# Patient Record
Sex: FEMALE | Race: WHITE | Employment: PART TIME | URBAN - METROPOLITAN AREA
[De-identification: names, ages, dates, MRNs, and addresses within clinical notes are randomized per-mention and may not be internally consistent; named-entity substitution may affect disease eponyms.]

---

## 2017-01-18 ENCOUNTER — TELEPHONE (OUTPATIENT)
Dept: FAMILY MEDICINE | Facility: CLINIC | Age: 42
End: 2017-01-18

## 2017-04-05 ENCOUNTER — TELEPHONE (OUTPATIENT)
Dept: FAMILY MEDICINE | Facility: CLINIC | Age: 42
End: 2017-04-05

## 2017-04-05 NOTE — TELEPHONE ENCOUNTER
Left message for patient to return call.  If calls needs physical and pap then to discuss having a mammogram. martinez

## 2017-04-27 ENCOUNTER — OFFICE VISIT (OUTPATIENT)
Dept: FAMILY MEDICINE | Facility: CLINIC | Age: 42
End: 2017-04-27
Payer: COMMERCIAL

## 2017-04-27 VITALS
TEMPERATURE: 98.8 F | DIASTOLIC BLOOD PRESSURE: 80 MMHG | BODY MASS INDEX: 26.6 KG/M2 | WEIGHT: 140.8 LBS | SYSTOLIC BLOOD PRESSURE: 114 MMHG | HEART RATE: 76 BPM

## 2017-04-27 DIAGNOSIS — M26.609 TMJ (TEMPOROMANDIBULAR JOINT SYNDROME): Primary | ICD-10-CM

## 2017-04-27 PROCEDURE — 99213 OFFICE O/P EST LOW 20 MIN: CPT | Performed by: PHYSICIAN ASSISTANT

## 2017-04-27 RX ORDER — PREDNISONE 20 MG/1
60 TABLET ORAL DAILY
Qty: 21 TABLET | Refills: 0 | Status: SHIPPED | OUTPATIENT
Start: 2017-04-27 | End: 2017-09-05

## 2017-04-27 RX ORDER — LORATADINE 10 MG/1
10 TABLET ORAL DAILY
COMMUNITY

## 2017-04-27 ASSESSMENT — ENCOUNTER SYMPTOMS
EYE REDNESS: 0
SHORTNESS OF BREATH: 0
DOUBLE VISION: 0
PHOTOPHOBIA: 0
HEARTBURN: 0
DEPRESSION: 0
CONSTIPATION: 0
FOCAL WEAKNESS: 0
BACK PAIN: 0
HALLUCINATIONS: 0
INSOMNIA: 0
DIZZINESS: 0
ORTHOPNEA: 0
FEVER: 0
DYSURIA: 0
LOSS OF CONSCIOUSNESS: 0
WEIGHT LOSS: 0
PALPITATIONS: 0
DIAPHORESIS: 0
MYALGIAS: 0
ABDOMINAL PAIN: 0
EYE PAIN: 0
COUGH: 0
WEAKNESS: 0
SEIZURES: 0
HEADACHES: 0
SORE THROAT: 0
HEMOPTYSIS: 0
FREQUENCY: 0
DIARRHEA: 0
SPUTUM PRODUCTION: 0
BLOOD IN STOOL: 0
NECK PAIN: 0
NAUSEA: 0
NERVOUS/ANXIOUS: 0
WHEEZING: 0
BLURRED VISION: 0
EYE DISCHARGE: 0
NEUROLOGICAL NEGATIVE: 1
VOMITING: 0
TINGLING: 0
SENSORY CHANGE: 0

## 2017-04-27 ASSESSMENT — LIFESTYLE VARIABLES: SUBSTANCE_ABUSE: 0

## 2017-04-27 ASSESSMENT — PAIN SCALES - GENERAL: PAINLEVEL: MODERATE PAIN (5)

## 2017-04-27 NOTE — NURSING NOTE
"Chief Complaint   Patient presents with     Jaw Pain       Initial /80 (BP Location: Right arm, Patient Position: Chair, Cuff Size: Adult Regular)  Pulse 76  Temp 98.8  F (37.1  C) (Tympanic)  Wt 140 lb 12.8 oz (63.9 kg)  BMI 26.6 kg/m2 Estimated body mass index is 26.6 kg/(m^2) as calculated from the following:    Height as of 9/2/16: 5' 1\" (1.549 m).    Weight as of this encounter: 140 lb 12.8 oz (63.9 kg).  Medication Reconciliation: complete    Health Maintenance that is potentially due pending provider review:  Pap Smear    Pt will schedule physical appt.    Mercedes ACHARYA CMA    "

## 2017-04-27 NOTE — MR AVS SNAPSHOT
"              After Visit Summary   2017    Kamille Montiel    MRN: [id number]           Patient Information     Date Of Birth          1975        Visit Information        Provider Department      2017 11:20 AM Tito Hair PA-C [facility]        Today's Diagnoses     TMJ (temporomandibular joint syndrome)    -  1       Follow-ups after your visit        Follow-up notes from your care team     Return if symptoms worsen or fail to improve.      Who to contact     If you have questions or need follow up information about today's clinic visit or your schedule please contact [facility] directly at [phone].  Normal or non-critical lab and imaging results will be communicated to you by MyChart, letter or phone within 4 business days after the clinic has received the results. If you do not hear from us within 7 days, please contact the clinic through MyChart or phone. If you have a critical or abnormal lab result, we will notify you by phone as soon as possible.  Submit refill requests through [employer] or call your pharmacy and they will forward the refill request to us. Please allow 3 business days for your refill to be completed.          Additional Information About Your Visit        MyChart Information     [employer] lets you send messages to your doctor, view your test results, renew your prescriptions, schedule appointments and more. To sign up, go to [email or web address] . Click on \"Log in\" on the left side of the screen, which will take you to the Welcome page. Then click on \"Sign up Now\" on the right side of the page.     You will be asked to enter the access code listed below, as well as some personal information. Please follow the directions to create your username and password.     Your access code is: [email or web address]  Expires: 2017 11:50 AM     Your access code will  in 90 days. If you need help or a new code, please call your [facility] or " [phone].        Care EveryWhere ID     This is your Care EveryWhere ID. This could be used by other organizations to access your [place] medical records  [id number]        Your Vitals Were     Pulse Temperature BMI (Body Mass Index)             76 98.8  F (37.1  C) (Tympanic) 26.6 kg/m2          Blood Pressure from Last 3 Encounters:   04/27/17 114/80   09/02/16 133/87   09/02/16 171/84    Weight from Last 3 Encounters:   04/27/17 140 lb 12.8 oz (63.9 kg)   09/02/16 144 lb 9.6 oz (65.6 kg)   04/12/16 134 lb (60.8 kg)              Today, you had the following     No orders found for display         Today's Medication Changes          These changes are accurate as of: 4/27/17 11:50 AM.  If you have any questions, ask your nurse or doctor.               Start taking these medicines.        Dose/Directions    predniSONE 20 MG tablet   Commonly known as:  DELTASONE   Used for:  TMJ (temporomandibular joint syndrome)   Started by:  Tito Hair PA-C        Dose:  60 mg   Take 3 tablets (60 mg) by mouth daily   Quantity:  21 tablet   Refills:  0            Where to get your medicines      These medications were sent to [facility] [address] [address]     Phone:  [phone]     predniSONE 20 MG tablet                Primary Care Provider Office Phone # Fax #    Marielos Torres [phone]       [address] [address]        Thank you!     Thank you for choosing [facility]  for your care. Our goal is always to provide you with excellent care. Hearing back from our patients is one way we can continue to improve our services. Please take a few minutes to complete the written survey that you may receive in the mail after your visit with us. Thank you!             Your Updated Medication List - Protect others around you: Learn how to safely use,  store and throw away your medicines at www.disposemymeds.org.          This list is accurate as of: 4/27/17 11:50 AM.  Always use your most recent med list.                   Brand Name Dispense Instructions for use    CLARITIN 10 MG tablet   Generic drug:  loratadine      Take 10 mg by mouth daily       CYCLOBENZAPRINE HCL PO      Take 2.5 mg by mouth       predniSONE 20 MG tablet    DELTASONE    21 tablet    Take 3 tablets (60 mg) by mouth daily

## 2017-04-27 NOTE — PROGRESS NOTES
HPI    SUBJECTIVE:                                                    Kamille Montiel is a 41 year old female who presents to clinic today for one to 2 week history of pain and swelling in the right TMJ area. She does not recall any injury. She states that she had some cold symptoms prior to this symptoms starting.    Jaw pain       Duration: One week     Description (location/character/radiation): Patient states that she has had jaw pain for a week. Also has off and on swelling     Intensity:  moderate    Accompanying signs and symptoms: Had some neck and shoulder pain. Also had a headache     History (similar episodes/previous evaluation): None    Precipitating or alleviating factors: None    Therapies tried and outcome: None     Problem list and histories reviewed & adjusted, as indicated.  Additional history: as documented    Patient Active Problem List   Diagnosis     Spondylolisthesis, lumbar region     Past Surgical History:   Procedure Laterality Date      SECTION      ,      FUSION SPINE POSTERIOR ONE LEVEL N/A 2016    Procedure: FUSION SPINE POSTERIOR ONE LEVEL;  Surgeon: Rosendo Soria MD;  Location: [facility] OR       Social History   Substance Use Topics     Smoking status: Never Smoker     Smokeless tobacco: Not on file     Alcohol use Not on file     History reviewed. No pertinent family history.      Current Outpatient Prescriptions   Medication Sig Dispense Refill     loratadine (CLARITIN) 10 MG tablet Take 10 mg by mouth daily       CYCLOBENZAPRINE HCL PO Take 2.5 mg by mouth       predniSONE (DELTASONE) 20 MG tablet Take 3 tablets (60 mg) by mouth daily 21 tablet 0     Allergies   Allergen Reactions     Sulfa Drugs Hives and Difficulty breathing     Labs reviewed in EPIC    Reviewed and updated as needed this visit by clinical staff       Reviewed and updated as needed this visit by Provider       Review of Systems   Constitutional: Negative for diaphoresis, fever, malaise/fatigue  "and weight loss.   HENT: Positive for ear pain. Negative for congestion, ear discharge, hearing loss, nosebleeds and sore throat.    Eyes: Negative for blurred vision, double vision, photophobia, pain, discharge and redness.   Respiratory: Negative for cough, hemoptysis, sputum production, shortness of breath and wheezing.    Cardiovascular: Negative for chest pain, palpitations, orthopnea and leg swelling.   Gastrointestinal: Negative for abdominal pain, blood in stool, constipation, diarrhea, heartburn, melena, nausea and vomiting.   Genitourinary: Negative.  Negative for dysuria, frequency and urgency.   Musculoskeletal: Negative for back pain, joint pain, myalgias and neck pain.   Skin: Negative for itching and rash.   Neurological: Negative.  Negative for dizziness, tingling, sensory change, focal weakness, seizures, loss of consciousness, weakness and headaches.   Endo/Heme/Allergies: Negative.    Psychiatric/Behavioral: Negative for depression, hallucinations, substance abuse and suicidal ideas. The patient is not nervous/anxious and does not have insomnia.          Physical Exam   Constitutional: She is oriented to person, place, and time and well-developed, well-nourished, and in no distress. No distress.   HENT:   Head: Normocephalic and atraumatic.       Right Ear: External ear normal.   Left Ear: External ear normal.   Nose: Nose normal.   Tenderness is present over the right TMJ with some \"clicking\". There is no lymphadenopathy and facial muscle function is normal   Eyes: Conjunctivae and EOM are normal. Pupils are equal, round, and reactive to light. Right eye exhibits no discharge. Left eye exhibits no discharge. No scleral icterus.   Neck: Normal range of motion. Neck supple. No JVD present. No tracheal deviation present. No thyromegaly present.   Cardiovascular: Normal rate, regular rhythm, normal heart sounds and intact distal pulses.  Exam reveals no gallop and no friction rub.    No murmur " heard.  Pulmonary/Chest: Effort normal and breath sounds normal. No stridor. No respiratory distress. She has no wheezes. She has no rales. She exhibits no tenderness.   Abdominal: Soft. Bowel sounds are normal. She exhibits no distension and no mass. There is no tenderness. There is no rebound and no guarding.   Musculoskeletal: Normal range of motion. She exhibits no edema or tenderness.   Lymphadenopathy:     She has no cervical adenopathy.   Neurological: She is alert and oriented to person, place, and time. She has normal reflexes. No cranial nerve deficit. She exhibits normal muscle tone. Gait normal.   Skin: Skin is warm and dry. No rash noted. She is not diaphoretic. No erythema. No pallor.   Psychiatric: Mood, memory, affect and judgment normal.       (Z84.473) TMJ (temporomandibular joint syndrome)  (primary encounter diagnosis)  Comment:   Plan: predniSONE (DELTASONE) 20 MG tablet        Follow-up if symptoms do not resolve with this treatment

## 2017-08-30 ENCOUNTER — NURSE TRIAGE (OUTPATIENT)
Dept: NURSING | Facility: CLINIC | Age: 42
End: 2017-08-30

## 2017-08-30 NOTE — TELEPHONE ENCOUNTER
Kamille has a deviated septum and can only breath out of one nostril.  Broke nose when 6 years old. [facility] is wanting to know  Who to see.  ENT or FP.   [facility] is requesting to schedule appointment with ENT MD.  No triage.

## 2017-09-05 ENCOUNTER — OFFICE VISIT (OUTPATIENT)
Dept: OTOLARYNGOLOGY | Facility: CLINIC | Age: 42
End: 2017-09-05
Payer: COMMERCIAL

## 2017-09-05 VITALS
DIASTOLIC BLOOD PRESSURE: 76 MMHG | WEIGHT: 138.8 LBS | TEMPERATURE: 98.1 F | HEIGHT: 61 IN | HEART RATE: 72 BPM | BODY MASS INDEX: 26.21 KG/M2 | SYSTOLIC BLOOD PRESSURE: 122 MMHG

## 2017-09-05 DIAGNOSIS — J34.2 DEVIATED NASAL SEPTUM: Primary | ICD-10-CM

## 2017-09-05 DIAGNOSIS — R09.81 NASAL CONGESTION: ICD-10-CM

## 2017-09-05 PROCEDURE — 99204 OFFICE O/P NEW MOD 45 MIN: CPT | Performed by: OTOLARYNGOLOGY

## 2017-09-05 RX ORDER — FLUTICASONE PROPIONATE 50 MCG
1-2 SPRAY, SUSPENSION (ML) NASAL DAILY
Qty: 16 G | Refills: 1 | Status: SHIPPED | OUTPATIENT
Start: 2017-09-05

## 2017-09-05 NOTE — PROGRESS NOTES
"    History of Present Illness - Kamille Montiel is a 42 year old female who presents for difficulty breathing through her nose. This has been present since a childhood incident when she struck her nose diving into a pool. She thinks she has a deviated septum, but then states that she feels that the side of nasal obstruction seems to alternate. She has a constant sense of drainage in her throat, which causes her to have a sore throat. She is wondering if correcting her septum would fix this.    Past Medical History -   Patient Active Problem List   Diagnosis     Spondylolisthesis, lumbar region       Current Medications -   Current Outpatient Prescriptions:      fluticasone (FLONASE) 50 MCG/ACT spray, Spray 1-2 sprays into both nostrils daily, Disp: 16 g, Rfl: 1     loratadine (CLARITIN) 10 MG tablet, Take 10 mg by mouth daily, Disp: , Rfl:     Allergies -   Allergies   Allergen Reactions     Sulfa Drugs Hives and Difficulty breathing       Social History -   Social History     Social History     Marital status:      Spouse name: N/A     Number of children: N/A     Years of education: N/A     Social History Main Topics     Smoking status: Never Smoker     Smokeless tobacco: Never Used     Alcohol use None     Drug use: None     Sexual activity: Yes     Partners: Male      Comment:  vas     Other Topics Concern     None     Social History Narrative       Family History - History reviewed. No pertinent family history.    Review of Systems - As per HPI and PMHx, otherwise 7 system review of the head and neck negative. 10+ system review negative.    Physical Exam  /76 (BP Location: Right arm, Patient Position: Chair, Cuff Size: Adult Regular)  Pulse 72  Temp 98.1  F (36.7  C) (Oral)  Ht 1.549 m (5' 1\")  Wt 63 kg (138 lb 12.8 oz)  BMI 26.23 kg/m2  General - The patient is well nourished and well developed, and appears to have good nutritional status.  Alert and oriented to person and place, answers " questions and cooperates with examination appropriately.   Head and Face - Normocephalic and atraumatic, with no gross asymmetry noted of the contour of the facial features.  The facial nerve is intact, with strong symmetric movements.  Voice and Breathing - The patient was breathing comfortably without the use of accessory muscles. There was no wheezing, stridor, or stertor.  The patients voice was clear and strong, and had appropriate pitch and quality.  Ears - Bilateral pinna and EACs with normal appearing overlying skin. Tympanic membrane intact with good mobility on pneumatic otoscopy bilaterally. Bony landmarks of the ossicular chain are normal. The tympanic membranes are normal in appearance. No retraction, perforation, or masses.  No fluid or purulence was seen in the external canal or the middle ear.   Eyes - Extraocular movements intact.  Sclera were not icteric or injected, conjunctiva were pink and moist.  Mouth - Examination of the oral cavity showed pink, healthy oral mucosa. No lesions or ulcerations noted.  The tongue was mobile and midline, and the dentition were in good condition.    Throat - The walls of the oropharynx were smooth, pink, moist, symmetric, and had no lesions or ulcerations.  The tonsillar pillars and soft palate were symmetric.  The uvula was midline on elevation.  Neck - Normal midline excursion of the laryngotracheal complex during swallowing.  Full range of motion on passive movement.  Palpation of the occipital, submental, submandibular, internal jugular chain, and supraclavicular nodes did not demonstrate any abnormal lymph nodes or masses.  The carotid pulse was palpable bilaterally.  Palpation of the thyroid was soft and smooth, with no nodules or goiter appreciated.  The trachea was mobile and midline.  Nose - External contour is symmetric, no gross deflection or scars.  Nasal mucosa is pink and moist with no abnormal mucus.  The septum slightly deviated left  anteroinferiorly and mildly obstructive, turbinates of normal size and position.  No polyps, masses, or purulence noted on examination.    Nasal decongestant applied bilaterally. Audible improvement in breathing bilaterally.       Assessment - Kamille Montiel is a 42 year old female with a history of allergies who presents to the clinic today for evaluation of nasal congestion. She is concerned about a deviated septum from trauma as a child. Examination shows a slightly left deviated septum. This could be contributing to some nasal obstruction, but the alternating nature of her symptoms suggests a possible role of turbinate cycle or congestion. I recommend that she try a nasal steroid for 1 month, and after that period of time if she still has sufficient symptoms, we could arrange a septoplasty and turbinate reduction.   I discussed the risks and benefits of septoplasty and turbinate reduction. I advised the patient that I estimated these procedures to have approximately a 70% change of improving her breathing. I also advised that she may not experience any improvement. I counseled on the risks of infection and blood loss associated with surgery as well.     This document serves as a record of the services and decisions personally performed and made by Dr. Sarthak Goldman MD. It was created on his behalf by Noy Vela, a trained medical scribe. The creation of this document is based the provider's statements to the medical scribe.  Noy Vela 3:45 PM 9/5/2017    Provider:   The information in this document, created by the medical scribe for me, accurately reflects the services I personally performed and the decisions made by me. I have reviewed and approved this document for accuracy prior to leaving the patient care area.  Dr. Sarthak Goldman MD 3:45 PM 9/5/2017    Dr. Sarthak Goldman MD  Otolaryngology  [facility]

## 2017-09-05 NOTE — NURSING NOTE
"Chief Complaint   Patient presents with     Consult     can only breath out of one nostril - has been going on for a long time - hx of possible broken nose in grade 7       Initial /76 (BP Location: Right arm, Patient Position: Chair, Cuff Size: Adult Regular)  Pulse 72  Temp 98.1  F (36.7  C) (Oral)  Ht 1.549 m (5' 1\")  Wt 63 kg (138 lb 12.8 oz)  BMI 26.23 kg/m2 Estimated body mass index is 26.23 kg/(m^2) as calculated from the following:    Height as of this encounter: 1.549 m (5' 1\").    Weight as of this encounter: 63 kg (138 lb 12.8 oz).  Medication Reconciliation: complete     Juan Mccabe CMA      "

## 2017-09-05 NOTE — PATIENT INSTRUCTIONS
Use Flonase for 1 month to see if this helps with your breathing. If you feel that after 1 month you still want me to straighten your septum, I can go in and shave off the crooked bits and do turbinate reduction. I don't know if that will help or not- I would estimate a 70% chance of improving your breathing.   Procedure: Septoplasty and turbinate reduction

## 2017-09-05 NOTE — MR AVS SNAPSHOT
"              After Visit Summary   9/5/2017    Kamille Montiel    MRN: [id number]           Patient Information     Date Of Birth          1975        Visit Information        Provider Department      9/5/2017 3:30 PM Sarthak Glodman MD [facility]        Today's Diagnoses     Deviated nasal septum    -  1    Nasal congestion          Care Instructions      Use Flonase for 1 month to see if this helps with your breathing. If you feel that after 1 month you still want me to straighten your septum, I can go in and shave off the crooked bits and do turbinate reduction. I don't know if that will help or not- I would estimate a 70% chance of improving your breathing.   Procedure: Septoplasty and turbinate reduction          Follow-ups after your visit        Who to contact     If you have questions or need follow up information about today's clinic visit or your schedule please contact [facility] directly at [phone].  Normal or non-critical lab and imaging results will be communicated to you by [employer], letter or phone within 4 business days after the clinic has received the results. If you do not hear from us within 7 days, please contact the clinic through [employer] or phone. If you have a critical or abnormal lab result, we will notify you by phone as soon as possible.  Submit refill requests through [email or web address] or call your pharmacy and they will forward the refill request to us. Please allow 3 business days for your refill to be completed.          Additional Information About Your Visit        [employer] Information     [employer] lets you send messages to your doctor, view your test results, renew your prescriptions, schedule appointments and more. To sign up, go to [email or web address] . Click on \"Log in\" on the left side of the screen, which will take you to the Welcome page. Then click on \"Sign up Now\" on the right side of the page.     You will be asked to enter the access code listed " "below, as well as some personal information. Please follow the directions to create your username and password.     Your access code is: [email or web address]  Expires: 2017 10:04 AM     Your access code will  in 90 days. If you need help or a new code, please call your [place] clinic or [phone].        Care EveryWhere ID     This is your Care EveryWhere ID. This could be used by other organizations to access your [place] medical records  [id number]        Your Vitals Were     Pulse Temperature Height BMI (Body Mass Index)          72 98.1  F (36.7  C) (Oral) 1.549 m (5' 1\") 26.23 kg/m2         Blood Pressure from Last 3 Encounters:   17 122/76   17 114/80   16 133/87    Weight from Last 3 Encounters:   17 63 kg (138 lb 12.8 oz)   17 63.9 kg (140 lb 12.8 oz)   16 65.6 kg (144 lb 9.6 oz)              Today, you had the following     No orders found for display         Today's Medication Changes          These changes are accurate as of: 17 11:59 PM.  If you have any questions, ask your nurse or doctor.               Start taking these medicines.        Dose/Directions    fluticasone 50 MCG/ACT spray   Commonly known as:  FLONASE   Used for:  Nasal congestion   Started by:  Sarthak Goldman MD        Dose:  1-2 spray   Spray 1-2 sprays into both nostrils daily   Quantity:  16 g   Refills:  1            Where to get your medicines      These medications were sent to [facility] [address]  [address] [facility] [address]     Phone:  [phone]     fluticasone 50 MCG/ACT spray                Primary Care Provider Office Phone # Fax #    Marielos Torres [phone]       [address] [facility] [address]        Equal Access to Services     OSVALDO CARLOS AH: Colby Chairez, sheridan mccracken, qaybta kaalanjelica velazquez. So [facility] " [phone].    ATENCIÓN: Si carey jennings, tiene a powell disposición servicios gratuitos de asistencia lingüística. Vickie josue [phone].    We comply with applicable federal civil rights laws and [place] laws. We do not discriminate on the basis of race, color, national origin, age, disability sex, sexual orientation or gender identity.            Thank you!     Thank you for choosing [facility]  for your care. Our goal is always to provide you with excellent care. Hearing back from our patients is one way we can continue to improve our services. Please take a few minutes to complete the written survey that you may receive in the mail after your visit with us. Thank you!             Your Updated Medication List - Protect others around you: Learn how to safely use, store and throw away your medicines at www.disposemymeds.org.          This list is accurate as of: 9/5/17 11:59 PM.  Always use your most recent med list.                   Brand Name Dispense Instructions for use Diagnosis    CLARITIN 10 MG tablet   Generic drug:  loratadine      Take 10 mg by mouth daily        fluticasone 50 MCG/ACT spray    FLONASE    16 g    Spray 1-2 sprays into both nostrils daily    Nasal congestion

## 2017-12-04 ENCOUNTER — TELEPHONE (OUTPATIENT)
Dept: FAMILY MEDICINE | Facility: CLINIC | Age: 42
End: 2017-12-04

## 2017-12-04 NOTE — TELEPHONE ENCOUNTER
Reviewing charts. Patient is due for a physical with pap smear.    Called and left message for patient to call clinic back. When patient calls back please help her schedule a physical with pap smear.    Valerie Bundy MA

## 2017-12-07 ENCOUNTER — TELEPHONE (OUTPATIENT)
Dept: FAMILY MEDICINE | Facility: CLINIC | Age: 42
End: 2017-12-07

## 2017-12-07 NOTE — TELEPHONE ENCOUNTER
"  Patient Communication Preferences indicate  Do not contact  and/or communication by \"Phone\" is not preferred. Call not required per Outreach team.    "

## 2017-12-24 ENCOUNTER — HEALTH MAINTENANCE LETTER (OUTPATIENT)
Age: 42
End: 2017-12-24

## 2018-03-01 ENCOUNTER — TELEPHONE (OUTPATIENT)
Dept: FAMILY MEDICINE | Facility: CLINIC | Age: 43
End: 2018-03-01

## 2018-03-01 NOTE — TELEPHONE ENCOUNTER
Reviewing charts. Patient is due for a physical with pap smear.    Called and left message for patient to call clinic back.    Valerie Bundy MA

## 2018-09-07 ENCOUNTER — OFFICE VISIT (OUTPATIENT)
Dept: FAMILY MEDICINE | Facility: CLINIC | Age: 43
End: 2018-09-07
Payer: COMMERCIAL

## 2018-09-07 VITALS
WEIGHT: 142 LBS | HEART RATE: 70 BPM | HEIGHT: 61 IN | OXYGEN SATURATION: 99 % | TEMPERATURE: 98 F | BODY MASS INDEX: 26.81 KG/M2 | SYSTOLIC BLOOD PRESSURE: 110 MMHG | DIASTOLIC BLOOD PRESSURE: 74 MMHG

## 2018-09-07 DIAGNOSIS — Z23 NEED FOR VACCINATION: Primary | ICD-10-CM

## 2018-09-07 PROCEDURE — 99212 OFFICE O/P EST SF 10 MIN: CPT | Mod: 25 | Performed by: FAMILY MEDICINE

## 2018-09-07 PROCEDURE — 90715 TDAP VACCINE 7 YRS/> IM: CPT | Performed by: FAMILY MEDICINE

## 2018-09-07 PROCEDURE — 90471 IMMUNIZATION ADMIN: CPT | Performed by: FAMILY MEDICINE

## 2018-09-07 NOTE — PROGRESS NOTES
SUBJECTIVE:   Kamille Montiel is a 43 year old female who presents to clinic today for the following health issues:      Chief Complaint   Patient presents with     Imm/Inj     Needs her TDAP updated.     Letter Request     She needs a letter stating that she is not able to get the flu vaccine due to reaction.  She will have a tight chest, problems with breathing, fever, wheezing.       Patient is a 43 yr old female here for a letter exempting her form the flu shot. She reports adverse reactions to the Flu vaccine that she has had for years. She usually develops some shortness of breath, congestion , fevers. She will also be needing to update her Tetanus immunization. No other complaints today.      Problem list and histories reviewed & adjusted, as indicated.  Additional history: as documented    Patient Active Problem List   Diagnosis     Spondylolisthesis, lumbar region     Past Surgical History:   Procedure Laterality Date      SECTION      ,      FUSION SPINE POSTERIOR ONE LEVEL N/A 2016    Procedure: FUSION SPINE POSTERIOR ONE LEVEL;  Surgeon: Rosendo Soria MD;  Location: [facility]       Social History   Substance Use Topics     Smoking status: Never Smoker     Smokeless tobacco: Never Used     Alcohol use No     History reviewed. No pertinent family history.      Current Outpatient Prescriptions   Medication Sig Dispense Refill     fluticasone (FLONASE) 50 MCG/ACT spray Spray 1-2 sprays into both nostrils daily (Patient not taking: Reported on 2018) 16 g 1     loratadine (CLARITIN) 10 MG tablet Take 10 mg by mouth daily       Allergies   Allergen Reactions     Sulfa Drugs Hives and Difficulty breathing     BP Readings from Last 3 Encounters:   18 110/74   17 122/76   17 114/80    Wt Readings from Last 3 Encounters:   18 142 lb (64.4 kg)   17 138 lb 12.8 oz (63 kg)   17 140 lb 12.8 oz (63.9 kg)                  Labs reviewed in EPIC    Reviewed and  "updated as needed this visit by clinical staff  Tobacco  Allergies  Meds  Med Hx  Surg Hx  Fam Hx  Soc Hx      Reviewed and updated as needed this visit by Provider         ROS:  Constitutional, HEENT, cardiovascular, pulmonary, gi and gu systems are negative, except as otherwise noted.    OBJECTIVE:     /74  Pulse 70  Temp 98  F (36.7  C) (Tympanic)  Ht 5' 0.75\" (1.543 m)  Wt 142 lb (64.4 kg)  SpO2 99%  BMI 27.05 kg/m2  Body mass index is 27.05 kg/(m^2).  GENERAL: healthy, alert and no distress  EYES: Eyes grossly normal to inspection, PERRL and conjunctivae and sclerae normal  HENT: ear canals and TM's normal, nose and mouth without ulcers or lesions  NECK: no adenopathy, no asymmetry, masses, or scars and thyroid normal to palpation  RESP: lungs clear to auscultation - no rales, rhonchi or wheezes  CV: regular rate and rhythm, normal S1 S2, no S3 or S4, no murmur, click or rub, no peripheral edema and peripheral pulses strong  MS: no gross musculoskeletal defects noted, no edema    Diagnostic Test Results:  none     ASSESSMENT/PLAN:   1. Need for vaccination  Letter written and signed .  - TDAP VACCINE (ADACEL)  - ADMIN 1st VACCINE    FUTURE APPOINTMENTS:       - Follow-up visit as needed.    Elmer Pollack MD  [facility]  "

## 2018-09-07 NOTE — LETTER
September 7, 2018      Kamille Montiel  [address] [place] [address]        To Whom It May Concern:    Kamille Montiel  was seen on 09/07/2018. Patient has had adverse effects to the influenza vaccine. Her reaction has included shortness of breath, wheezing, congestion and fevers. These symptoms happen within hours of getting the flu shot. We will recommend that she not get the flu shot as this has happened several times in her case. Please call if you have any questions.      Sincerely,        Elmer Pollack MD

## 2018-09-07 NOTE — NURSING NOTE
Screening Questionnaire for Adult Immunization    Are you sick today?   No   Do you have allergies to medications, food, a vaccine component or latex?   YES-sulfa   Have you ever had a serious reaction after receiving a vaccination?   No   Do you have a long-term health problem with heart disease, lung disease, asthma, kidney disease, metabolic disease (e.g. diabetes), anemia, or other blood disorder?   No   Do you have cancer, leukemia, HIV/AIDS, or any other immune system problem?   No   In the past 3 months, have you taken medications that affect  your immune system, such as prednisone, other steroids, or anticancer drugs; drugs for the treatment of rheumatoid arthritis, Crohn s disease, or psoriasis; or have you had radiation treatments?   No   Have you had a seizure, or a brain or other nervous system problem?   No   During the past year, have you received a transfusion of blood or blood     products, or been given immune (gamma) globulin or antiviral drug?   No   For women: Are you pregnant or is there a chance you could become        pregnant during the next month?   No   Have you received any vaccinations in the past 4 weeks?   No     Immunization questionnaire answers were all negative except one which was reviewed and okayed.       Per orders of Dr. Pollack, injection of Tdap given by Linh Ma. Patient instructed to remain in clinic for 15 minutes afterwards, and to report any adverse reaction to me immediately.       Screening performed by Linh Ma on 9/7/2018 at 7:55 AM.

## 2018-09-07 NOTE — MR AVS SNAPSHOT
After Visit Summary   9/7/2018    Kamille Montiel    MRN: [id number]           Patient Information     Date Of Birth          1975        Visit Information        Provider Department      9/7/2018 7:20 AM Elmer Pollack MD [facility]        Today's Diagnoses     Need for vaccination    -  1      Care Instructions          Thank you for choosing [facility].  You may be receiving a survey in the mail from [facility] regarding your visit today.  Please take a few minutes to complete and return the survey to let us know how we are doing.      If you have questions or concerns, please contact us via The Point or you can contact your care team at [phone].    Our Clinic hours are:  Monday 6:40 am  to 7:00 pm  Tuesday -Friday 6:40 am to 5:00 pm    The [place] outpatient lab hours are:  Monday - Friday 6:10 am to 4:45 pm  Saturdays 7:00 am to 11:00 am  Appointments are required, call [phone]    If you have clinical questions after hours or would like to schedule an appointment,  call the clinic at [phone].            Follow-ups after your visit        Who to contact     If you have questions or need follow up information about today's clinic visit or your schedule please contact [facility] directly at [phone].  Normal or non-critical lab and imaging results will be communicated to you by Appinionshart, letter or phone within 4 business days after the clinic has received the results. If you do not hear from us within 7 days, please contact the clinic through Innovegat or phone. If you have a critical or abnormal lab result, we will notify you by phone as soon as possible.  Submit refill requests through The Point or call your pharmacy and they will forward the refill request to us. Please allow 3 business days for your refill to be completed.          Additional Information About Your Visit        AppinionsharI-Stand Information     The Point lets you send messages to  "your doctor, view your test results, renew your prescriptions, schedule appointments and more. To sign up, go to [email or web address] . Click on \"Log in\" on the left side of the screen, which will take you to the Welcome page. Then click on \"Sign up Now\" on the right side of the page.     You will be asked to enter the access code listed below, as well as some personal information. Please follow the directions to create your username and password.     Your access code is: [email or web address]  Expires: 2018  7:19 AM     Your access code will  in 90 days. If you need help or a new code, please call your [place] clinic or [phone].        Care EveryWhere ID     This is your Care EveryWhere ID. This could be used by other organizations to access your [place] medical records  [id number]        Your Vitals Were     Pulse Temperature Height Pulse Oximetry BMI (Body Mass Index)       70 98  F (36.7  C) (Tympanic) 5' 0.75\" (1.543 m) 99% 27.05 kg/m2        Blood Pressure from Last 3 Encounters:   18 110/74   17 122/76   17 114/80    Weight from Last 3 Encounters:   18 142 lb (64.4 kg)   17 138 lb 12.8 oz (63 kg)   17 140 lb 12.8 oz (63.9 kg)              We Performed the Following     ADMIN 1st VACCINE     TDAP VACCINE (ADACEL)        Primary Care Provider Office Phone # Fax #    Marielos Torres [phone] [address]  [facility] [address]        Equal Access to Services     DEDE CARLOS : Hadii mike Chairez, sheridan mccracken, thuan piper, anjelica weston. So [facility] [phone].    ATENCIÓN: Si habla español, tiene a powell disposición servicios gratuitos de asistencia lingüística. Llame al [phone].    We comply with applicable federal civil rights laws and [place] laws. We do not discriminate on the basis of race, color, national origin, age, disability, sex, sexual orientation, or " gender identity.            Thank you!     Thank you for choosing [facility]  for your care. Our goal is always to provide you with excellent care. Hearing back from our patients is one way we can continue to improve our services. Please take a few minutes to complete the written survey that you may receive in the mail after your visit with us. Thank you!             Your Updated Medication List - Protect others around you: Learn how to safely use, store and throw away your medicines at www.disposemymeds.org.          This list is accurate as of 9/7/18  7:56 AM.  Always use your most recent med list.                   Brand Name Dispense Instructions for use Diagnosis    CLARITIN 10 MG tablet   Generic drug:  loratadine      Take 10 mg by mouth daily        fluticasone 50 MCG/ACT spray    FLONASE    16 g    Spray 1-2 sprays into both nostrils daily    Nasal congestion

## 2018-09-07 NOTE — PATIENT INSTRUCTIONS
Thank you for choosing [facility].  You may be receiving a survey in the mail from [facility] regarding your visit today.  Please take a few minutes to complete and return the survey to let us know how we are doing.      If you have questions or concerns, please contact us via [email or web address] or you can contact your care team at [phone].    Our Clinic hours are:  Monday 6:40 am  to 7:00 pm  Tuesday -Friday 6:40 am to 5:00 pm    The [place] outpatient lab hours are:  Monday - Friday 6:10 am to 4:45 pm  Saturdays 7:00 am to 11:00 am  Appointments are required, call [phone]    If you have clinical questions after hours or would like to schedule an appointment,  call the clinic at [phone].